# Patient Record
Sex: MALE | Race: BLACK OR AFRICAN AMERICAN | NOT HISPANIC OR LATINO | Employment: OTHER | URBAN - METROPOLITAN AREA
[De-identification: names, ages, dates, MRNs, and addresses within clinical notes are randomized per-mention and may not be internally consistent; named-entity substitution may affect disease eponyms.]

---

## 2017-02-20 PROBLEM — Z12.11 SCREEN FOR COLON CANCER: Status: ACTIVE | Noted: 2017-02-20

## 2019-08-22 PROBLEM — I10 HYPERTENSION, UNCONTROLLED: Status: ACTIVE | Noted: 2019-08-22

## 2021-05-12 ENCOUNTER — PATIENT MESSAGE (OUTPATIENT)
Dept: RESEARCH | Facility: HOSPITAL | Age: 56
End: 2021-05-12

## 2021-07-01 ENCOUNTER — PATIENT MESSAGE (OUTPATIENT)
Dept: ADMINISTRATIVE | Facility: OTHER | Age: 56
End: 2021-07-01

## 2022-12-24 PROBLEM — I16.1 HYPERTENSIVE EMERGENCY: Status: ACTIVE | Noted: 2022-12-24

## 2022-12-24 PROBLEM — I21.4 NSTEMI (NON-ST ELEVATED MYOCARDIAL INFARCTION): Status: ACTIVE | Noted: 2022-12-24

## 2023-01-04 PROBLEM — I50.22 CHRONIC SYSTOLIC HEART FAILURE: Status: ACTIVE | Noted: 2023-01-04

## 2023-01-04 PROBLEM — I51.7 LVH (LEFT VENTRICULAR HYPERTROPHY): Status: ACTIVE | Noted: 2023-01-04

## 2023-01-04 PROBLEM — I25.118 CORONARY ARTERY DISEASE OF NATIVE ARTERY OF NATIVE HEART WITH STABLE ANGINA PECTORIS: Status: ACTIVE | Noted: 2023-01-04

## 2023-01-04 PROBLEM — I42.8 NICM (NONISCHEMIC CARDIOMYOPATHY): Status: ACTIVE | Noted: 2023-01-04

## 2023-03-27 PROBLEM — I21.4 NSTEMI (NON-ST ELEVATED MYOCARDIAL INFARCTION): Status: RESOLVED | Noted: 2022-12-24 | Resolved: 2023-03-27

## 2023-05-26 PROBLEM — R56.9 SEIZURE: Status: ACTIVE | Noted: 2023-05-26

## 2023-05-26 PROBLEM — I49.01 CARDIAC ARREST WITH VENTRICULAR FIBRILLATION: Status: ACTIVE | Noted: 2023-05-26

## 2023-05-26 PROBLEM — D75.839 THROMBOCYTOSIS: Status: ACTIVE | Noted: 2023-05-26

## 2023-05-26 PROBLEM — I25.10 CORONARY ARTERY DISEASE INVOLVING NATIVE CORONARY ARTERY OF NATIVE HEART: Status: ACTIVE | Noted: 2023-05-26

## 2023-05-26 PROBLEM — Z99.11 ON MECHANICALLY ASSISTED VENTILATION: Status: ACTIVE | Noted: 2023-05-26

## 2023-05-26 PROBLEM — I46.9 CARDIAC ARREST WITH VENTRICULAR FIBRILLATION: Status: ACTIVE | Noted: 2023-05-26

## 2023-05-26 PROBLEM — I50.20 HFREF (HEART FAILURE WITH REDUCED EJECTION FRACTION): Status: ACTIVE | Noted: 2023-05-26

## 2023-05-26 PROBLEM — F19.90 SUBSTANCE USE DISORDER: Status: ACTIVE | Noted: 2023-05-26

## 2023-05-27 PROBLEM — N17.9 AKI (ACUTE KIDNEY INJURY): Status: ACTIVE | Noted: 2023-05-27

## 2023-05-27 PROBLEM — I21.4 NSTEMI (NON-ST ELEVATED MYOCARDIAL INFARCTION): Status: ACTIVE | Noted: 2023-05-27

## 2023-05-27 PROBLEM — I50.22 CHRONIC SYSTOLIC HEART FAILURE: Status: ACTIVE | Noted: 2023-05-27

## 2023-05-27 PROBLEM — I16.1 HYPERTENSIVE EMERGENCY: Status: ACTIVE | Noted: 2023-05-27

## 2023-05-27 PROBLEM — J96.01 ACUTE HYPOXEMIC RESPIRATORY FAILURE: Status: ACTIVE | Noted: 2023-05-27

## 2023-05-27 PROBLEM — F14.10 COCAINE ABUSE: Status: ACTIVE | Noted: 2023-05-27

## 2023-05-28 PROBLEM — Z99.11 ON MECHANICALLY ASSISTED VENTILATION: Status: RESOLVED | Noted: 2023-05-26 | Resolved: 2023-05-28

## 2023-05-28 PROBLEM — I16.1 HYPERTENSIVE EMERGENCY: Status: RESOLVED | Noted: 2023-05-27 | Resolved: 2023-05-28

## 2023-05-28 PROBLEM — I10 HYPERTENSION: Status: ACTIVE | Noted: 2023-05-28

## 2023-05-28 PROBLEM — J96.01 ACUTE HYPOXEMIC RESPIRATORY FAILURE: Status: RESOLVED | Noted: 2023-05-27 | Resolved: 2023-05-28

## 2023-05-31 PROBLEM — F11.10 OPIOID ABUSE: Status: ACTIVE | Noted: 2023-05-31

## 2023-05-31 PROBLEM — R79.89 ELEVATED TROPONIN: Status: ACTIVE | Noted: 2023-05-31

## 2023-07-28 PROBLEM — T14.8XXA POST-TRAUMATIC WOUND INFECTION: Status: ACTIVE | Noted: 2023-07-28

## 2023-07-28 PROBLEM — L97.312 CHRONIC ULCER OF RIGHT ANKLE WITH FAT LAYER EXPOSED: Status: ACTIVE | Noted: 2023-07-28

## 2023-07-28 PROBLEM — I50.42 CHRONIC COMBINED SYSTOLIC AND DIASTOLIC CONGESTIVE HEART FAILURE: Status: ACTIVE | Noted: 2023-07-28

## 2023-07-28 PROBLEM — T14.8XXA POST-TRAUMATIC WOUND INFECTION: Status: RESOLVED | Noted: 2023-07-28 | Resolved: 2023-07-28

## 2023-07-28 PROBLEM — L08.9 POST-TRAUMATIC WOUND INFECTION: Status: ACTIVE | Noted: 2023-07-28

## 2023-07-28 PROBLEM — L08.9 POST-TRAUMATIC WOUND INFECTION: Status: RESOLVED | Noted: 2023-07-28 | Resolved: 2023-07-28

## 2023-07-29 PROBLEM — I16.1 HYPERTENSIVE EMERGENCY: Status: RESOLVED | Noted: 2022-12-24 | Resolved: 2023-07-29

## 2023-08-03 ENCOUNTER — PATIENT OUTREACH (OUTPATIENT)
Dept: ADMINISTRATIVE | Facility: CLINIC | Age: 58
End: 2023-08-03

## 2023-08-03 ENCOUNTER — PATIENT MESSAGE (OUTPATIENT)
Dept: ADMINISTRATIVE | Facility: CLINIC | Age: 58
End: 2023-08-03

## 2023-08-03 NOTE — PROGRESS NOTES
C3 nurse attempted to contact Seth Lizama for a TCC post hospital discharge follow up call. No answer. Left voicemail with callback information. The patient has a scheduled HOSFU appointment with Gio Scott MD (ortho) on 8/4/23 @ 12:00pm.

## 2023-08-04 NOTE — PROGRESS NOTES
3rd attempt-C3 nurse attempted to contact Seth Lizama for a TCC post hospital discharge follow up call. No answer. Left voicemail with callback information, and OOC#.  The patient has a scheduled HOSFU appointment with Gio Scott MD (ortho) on 8/4/23 @ 12:00pm, but it does not appear that the patient attended this appointment.

## 2023-08-28 PROBLEM — I21.4 NSTEMI (NON-ST ELEVATED MYOCARDIAL INFARCTION): Status: RESOLVED | Noted: 2023-05-27 | Resolved: 2023-08-28

## 2023-08-28 PROBLEM — N17.9 AKI (ACUTE KIDNEY INJURY): Status: RESOLVED | Noted: 2023-05-27 | Resolved: 2023-08-28

## 2023-10-19 PROBLEM — Z95.810 PRESENCE OF SINGLE CHAMBER AUTOMATIC CARDIOVERTER/DEFIBRILLATOR (AICD): Status: ACTIVE | Noted: 2023-10-19

## 2023-10-19 PROBLEM — E78.5 DYSLIPIDEMIA: Status: ACTIVE | Noted: 2023-10-19

## 2023-10-19 PROBLEM — D64.9 ANEMIA: Status: ACTIVE | Noted: 2023-10-19

## 2023-10-19 PROBLEM — Z87.898 HISTORY OF CRACK COCAINE USE: Status: ACTIVE | Noted: 2023-10-19

## 2023-10-19 PROBLEM — I42.0 DILATED CARDIOMYOPATHY: Status: ACTIVE | Noted: 2023-01-04

## 2023-10-19 PROBLEM — R94.31 NONSPECIFIC ABNORMAL ELECTROCARDIOGRAM (ECG) (EKG): Status: ACTIVE | Noted: 2023-10-19

## 2024-01-09 PROBLEM — Z91.199 PERSONAL HISTORY OF NONCOMPLIANCE WITH MEDICAL TREATMENT: Status: ACTIVE | Noted: 2024-01-09

## 2024-01-09 PROBLEM — Z86.74 HISTORY OF SUDDEN CARDIAC ARREST SUCCESSFULLY RESUSCITATED: Status: ACTIVE | Noted: 2024-01-09

## 2024-01-09 PROBLEM — I25.2 HISTORY OF NON-ST ELEVATION MYOCARDIAL INFARCTION (NSTEMI): Status: ACTIVE | Noted: 2024-01-09
